# Patient Record
Sex: FEMALE | NOT HISPANIC OR LATINO | Employment: OTHER | URBAN - METROPOLITAN AREA
[De-identification: names, ages, dates, MRNs, and addresses within clinical notes are randomized per-mention and may not be internally consistent; named-entity substitution may affect disease eponyms.]

---

## 2019-08-08 ENCOUNTER — OFFICE VISIT (OUTPATIENT)
Dept: OPTOMETRY | Facility: CLINIC | Age: 64
End: 2019-08-08
Payer: COMMERCIAL

## 2019-08-08 DIAGNOSIS — H52.4 PRESBYOPIA: ICD-10-CM

## 2019-08-08 DIAGNOSIS — Z01.00 EXAMINATION OF EYES AND VISION: Primary | ICD-10-CM

## 2019-08-08 DIAGNOSIS — H52.223 REGULAR ASTIGMATISM OF BOTH EYES: ICD-10-CM

## 2019-08-08 DIAGNOSIS — E11.9 TYPE 2 DIABETES MELLITUS WITHOUT COMPLICATION, UNSPECIFIED WHETHER LONG TERM INSULIN USE (H): ICD-10-CM

## 2019-08-08 DIAGNOSIS — H25.13 AGE-RELATED NUCLEAR CATARACT OF BOTH EYES: ICD-10-CM

## 2019-08-08 DIAGNOSIS — H52.03 HYPEROPIA, BILATERAL: ICD-10-CM

## 2019-08-08 PROCEDURE — 92015 DETERMINE REFRACTIVE STATE: CPT | Performed by: OPTOMETRIST

## 2019-08-08 PROCEDURE — 92004 COMPRE OPH EXAM NEW PT 1/>: CPT | Performed by: OPTOMETRIST

## 2019-08-08 SDOH — HEALTH STABILITY: MENTAL HEALTH: HOW OFTEN DO YOU HAVE A DRINK CONTAINING ALCOHOL?: NEVER

## 2019-08-08 ASSESSMENT — VISUAL ACUITY
OD_CC: 20/40
OD_SC: 20/40
METHOD: SNELLEN - LINEAR
OD_CC: 20/30
OD_CC+: -1
OD_SC: 20/400
OS_SC: 20/400
CORRECTION_TYPE: GLASSES
OS_CC: 20/40
OS_SC: 20/40
OS_CC: 20/30

## 2019-08-08 ASSESSMENT — REFRACTION_WEARINGRX
OS_SPHERE: +2.75
OD_CYLINDER: +0.75
OS_VPRISM: .5
OD_SPHERE: +2.50
OD_HPRISM: .5
OD_VPRISM: .5
OS_AXIS: 002
OS_HPRISM: .5
OS_HBASE: IN
OD_AXIS: 166
OD_VBASE: DOWN
OS_CYLINDER: +0.50
OD_HBASE: OUT
OS_VBASE: DOWN

## 2019-08-08 ASSESSMENT — CUP TO DISC RATIO
OS_RATIO: 0.5
OD_RATIO: 0.5

## 2019-08-08 ASSESSMENT — CONF VISUAL FIELD
OS_NORMAL: 1
OD_NORMAL: 1

## 2019-08-08 ASSESSMENT — REFRACTION_MANIFEST
OD_CYLINDER: +1.00
OS_AXIS: 002
OS_CYLINDER: +0.50
OD_ADD: +2.50
OS_SPHERE: +1.25
OD_SPHERE: +1.25
OD_AXIS: 170
OS_ADD: +2.50

## 2019-08-08 ASSESSMENT — TONOMETRY
IOP_METHOD: TONOPEN
OS_IOP_MMHG: 18
OD_IOP_MMHG: 19

## 2019-08-08 ASSESSMENT — SLIT LAMP EXAM - LIDS
COMMENTS: NORMAL
COMMENTS: NORMAL

## 2019-08-08 ASSESSMENT — EXTERNAL EXAM - LEFT EYE: OS_EXAM: NORMAL

## 2019-08-08 ASSESSMENT — EXTERNAL EXAM - RIGHT EYE: OD_EXAM: NORMAL

## 2019-08-08 NOTE — PROGRESS NOTES
Chief Complaint   Patient presents with     Diabetic Eye Exam     Accompanied by   No results found for: A1C    Last Eye Exam: 2 years ago  Dilated Previously: Yes    What are you currently using to see?  glasses    Distance Vision Acuity: Satisfied with vision    Near Vision Acuity: Satisfied with vision while reading  with glasses    Eye Comfort: dry  Do you use eye drops? : Yes: OTC tears  Occupation or Hobbies: retired    Elinor Manzo  Vision Services Supervisor     Medical, surgical and family histories reviewed and updated 8/8/2019.       OBJECTIVE: See Ophthalmology exam    ASSESSMENT:    ICD-10-CM    1. Examination of eyes and vision Z01.00    2. Type 2 diabetes mellitus without complication, unspecified whether long term insulin use (H) E11.9    3. Age-related nuclear cataract of both eyes H25.13    4. Hyperopia, bilateral H52.03    5. Regular astigmatism of both eyes H52.223    6. Presbyopia H52.4       PLAN:    Belkis Baca aware  eye exam results will be sent to No Ref-Primary, Physician.  Patient Instructions   There are not any signs of the diabetes affecting the eyes today.  It is important that you get your eyes dilated once yearly and keep good control of your diabetes.    You have the start of mild cataracts.  You may notice some blurred vision or glare with night driving.  It is important that you wear good sunglasses to protect your eyes from the ultraviolet light from the sun.  Taking a supplement with at least 300 mg/day of vitamin C appears to help prevent cataract development.  I recommend that you return in 1 year for an eye exam unless there are any sudden changes in your vision.       Eyeglass prescription given.  Your options are a bifocal which would allow you to see distance and near vision or separate glasses for distance and reading.    Return in 1 year for a complete eye exam or sooner if needed.    Duncan Link, OD

## 2019-08-08 NOTE — PATIENT INSTRUCTIONS
There are not any signs of the diabetes affecting the eyes today.  It is important that you get your eyes dilated once yearly and keep good control of your diabetes.    You have the start of mild cataracts.  You may notice some blurred vision or glare with night driving.  It is important that you wear good sunglasses to protect your eyes from the ultraviolet light from the sun.  Taking a supplement with at least 300 mg/day of vitamin C appears to help prevent cataract development.  I recommend that you return in 1 year for an eye exam unless there are any sudden changes in your vision.       Eyeglass prescription given.  Your options are a bifocal which would allow you to see distance and near vision or separate glasses for distance and reading.    Return in 1 year for a complete eye exam or sooner if needed.    Duncan Link, NAIDA      The affects of the dilating drops last for 4- 6 hours.  You will be more sensitive to light and vision will be blurry up close.  Mydriatic sunglasses were given if needed.    Patient Education   Diabetes weakens the blood vessels all over the body, including the eyes. Damage to the blood vessels in the eyes can cause swelling or bleeding into part of the eye (called the retina). This is called diabetic retinopathy (JUVENTINO-tin-AH-puh-thee). If not treated, this disease can cause vision loss or blindness.   Symptoms may include blurred or distorted vision, but many people have no symptoms. It's important to see your eye doctor regularly to check for problems.   Early treatment and good control can help protect your vision. Here are the things you can do to help prevent vision loss:      1. Keep your blood sugar levels under tight control.      2. Bring high blood pressure under control.      3. No smoking.      4. Have yearly dilated eye exams.         Optometry Providers       Clinic Locations                                 Telephone Number   Dr. Kaye Song  [address] [facility]   [place]   [place] [phone]     [facility] Hours:                [facility] Hours:       [place] [facility] Hours:   [address]   [address]     [address]  [place] [address]   [place] [address]    [place] [address]  Phone: [phone]                    Phone: [phone]     Phone: [phone]                      Monday 8:00-7:00                          Monday 8:00-7:00                          Monday 8:00-7:00              Tuesday 8:00-6:00                          Tuesday 8:00-7:00                          Tuesday 8:00-7:00              Wednesday 8:00-6:00                  Wednesday 8:00-7:00                   Wednesday 8:00-7:00      Thursday 8:00-6:00                        Thursday 8:00-7:00                         Thursday 8:00-7:00            Friday 8:00-5:00                              Friday 8:00-5:00                              Friday 8:00-5:00    [facility] Hours:   [address] [place] [address]  [phone]    Monday 8:00-7:00  Tuesday 8:00-7:00  Wednesday 8:00-7:00  Thursday 8:00-7:00  Friday 8:00-5:00  Please log on to [email or web address] to order your contact lenses.  The link is found on the [facility] Care [facility] page.  As always, Thank you for trusting us with your health care needs!

## 2019-08-08 NOTE — LETTER
8/8/2019         RE: Belkis Baca  [address]  [facility] [address]        Dear Colleague,    Thank you for referring your patient, Belkis Baca, to the [facility]. Please see a copy of my visit note below.    Chief Complaint   Patient presents with     Diabetic Eye Exam     Accompanied by   No results found for: A1C    Last Eye Exam: 2 years ago  Dilated Previously: Yes    What are you currently using to see?  glasses    Distance Vision Acuity: Satisfied with vision    Near Vision Acuity: Satisfied with vision while reading  with glasses    Eye Comfort: dry  Do you use eye drops? : Yes: OTC tears  Occupation or Hobbies: retired    [employer]  Vision Services Supervisor     Medical, surgical and family histories reviewed and updated 8/8/2019.       OBJECTIVE: See Ophthalmology exam    ASSESSMENT:    ICD-10-CM    1. Examination of eyes and vision Z01.00    2. Type 2 diabetes mellitus without complication, unspecified whether long term insulin use (H) E11.9    3. Age-related nuclear cataract of both eyes H25.13    4. Hyperopia, bilateral H52.03    5. Regular astigmatism of both eyes H52.223    6. Presbyopia H52.4       PLAN:    Belkis Baca aware  eye exam results will be sent to No Ref-Primary, Physician.  Patient Instructions   There are not any signs of the diabetes affecting the eyes today.  It is important that you get your eyes dilated once yearly and keep good control of your diabetes.    You have the start of mild cataracts.  You may notice some blurred vision or glare with night driving.  It is important that you wear good sunglasses to protect your eyes from the ultraviolet light from the sun.  Taking a supplement with at least 300 mg/day of vitamin C appears to help prevent cataract development.  I recommend that you return in 1 year for an eye exam unless there are any sudden changes in your vision.       Eyeglass prescription given.  Your options are a bifocal which  would allow you to see distance and near vision or separate glasses for distance and reading.    Return in 1 year for a complete eye exam or sooner if needed.    Duncan Link, OD             Again, thank you for allowing me to participate in the care of your patient.        Sincerely,        Duncan Link, OD

## 2021-12-20 ENCOUNTER — OFFICE VISIT (OUTPATIENT)
Dept: OPTOMETRY | Facility: CLINIC | Age: 66
End: 2021-12-20
Payer: COMMERCIAL

## 2021-12-20 DIAGNOSIS — Z53.9 ERRONEOUS ENCOUNTER--DISREGARD: Primary | ICD-10-CM

## 2021-12-20 ASSESSMENT — REFRACTION_MANIFEST
OD_SPHERE: +1.25
OD_AXIS: 161
OD_CYLINDER: +2.25
OD_AXIS: 170
OS_CYLINDER: +0.50
OD_SPHERE: +0.25
OS_AXIS: 002
OD_CYLINDER: +1.00
METHOD_AUTOREFRACTION: 1
OS_SPHERE: +1.00
OS_AXIS: 003
OS_SPHERE: +1.25
OS_CYLINDER: +0.75

## 2021-12-20 ASSESSMENT — REFRACTION_WEARINGRX
OD_CYLINDER: +1.00
OD_AXIS: 170
OS_AXIS: 002
OS_AXIS: 004
OS_SPHERE: +1.25
OD_SPHERE: +2.50
OS_CYLINDER: +0.50
SPECS_TYPE: SVL
OD_SPHERE: +1.25
OS_ADD: +2.50
OS_SPHERE: +2.50
OD_AXIS: 170
OS_CYLINDER: +0.50
OD_CYLINDER: +0.50
OD_ADD: +2.50

## 2021-12-20 ASSESSMENT — VISUAL ACUITY
OD_SC: 20/200
OD_CC: 20/50
OD_PH_SC: 20/40
OS_SC: 20/200
OS_SC: 20/40
CORRECTION_TYPE: GLASSES
OS_PH_SC: 20/30
OS_CC: 20/50
METHOD: SNELLEN - LINEAR
OD_SC: 20/60

## 2021-12-20 ASSESSMENT — KERATOMETRY
OD_K1POWER_DIOPTERS: 44.25
OS_AXISANGLE2_DEGREES: 068
OS_AXISANGLE_DEGREES: 158
OD_AXISANGLE2_DEGREES: 095
OS_K2POWER_DIOPTERS: 45.25
OD_K2POWER_DIOPTERS: 44.75
OD_AXISANGLE_DEGREES: 005
OS_K1POWER_DIOPTERS: 44.25

## 2021-12-20 NOTE — PROGRESS NOTES
Chief Complaint   Patient presents with     Diabetic Eye Exam     Accompanied by  on ipad  Chief Complaint(s) and History of Present Illness(es)     Diabetic Eye Exam     Vision: is stable    Diabetes Type: Type 2 and taking oral medications    Duration: 20 years    Blood Sugars: is controlled               No results found for: A1C, HEMOGLOBINA1     patient unsure but thinks last a1c was 6 point something    Last Eye Exam: 8-8-2019  Dilated Previously: Yes    What are you currently using to see?  glasses    Distance Vision Acuity: Satisfied with vision    Near Vision Acuity: Satisfied with vision while reading  unaided    Eye Comfort: good  Do you use eye drops? : Yes: unknown otc drops  Occupation or Hobbies: none    Ekaterina Hernandez Optometric Assistant, A.B.O.C.     Medical, surgical and family histories reviewed and updated 12/20/2021.       OBJECTIVE: See Ophthalmology exam    ASSESSMENT:  No diagnosis found.   PLAN:    Belkis angela  eye exam results will be sent to No Ref-Primary, Physician.  There are no Patient Instructions on file for this visit.

## 2021-12-20 NOTE — LETTER
12/20/2021         RE: Belkis Baca  [address]  [facility] [address]        Dear Colleague,    Thank you for referring your patient, Belkis Baca, to the [facility]. Please see a copy of my visit note below.    Patient needed to leave for an appointment with her .  She rescheduled for tomorrow with Dr. Ashtyn Pearson.    Duncan Link OD        Again, thank you for allowing me to participate in the care of your patient.        Sincerely,        Duncan Link OD

## 2021-12-20 NOTE — PROGRESS NOTES
Patient needed to leave for an appointment with her .  She rescheduled for tomorrow with Dr. Ashtyn Pearson.    Duncan Link OD

## 2021-12-21 ENCOUNTER — APPOINTMENT (OUTPATIENT)
Dept: OPTOMETRY | Facility: CLINIC | Age: 66
End: 2021-12-21
Payer: COMMERCIAL

## 2021-12-21 ENCOUNTER — OFFICE VISIT (OUTPATIENT)
Dept: OPTOMETRY | Facility: CLINIC | Age: 66
End: 2021-12-21
Payer: COMMERCIAL

## 2021-12-21 DIAGNOSIS — H52.4 PRESBYOPIA: ICD-10-CM

## 2021-12-21 DIAGNOSIS — H25.13 AGE-RELATED NUCLEAR CATARACT OF BOTH EYES: ICD-10-CM

## 2021-12-21 DIAGNOSIS — E11.36 TYPE 2 DIABETES MELLITUS WITH DIABETIC CATARACT, UNSPECIFIED WHETHER LONG TERM INSULIN USE (H): Primary | ICD-10-CM

## 2021-12-21 DIAGNOSIS — H52.03 HYPEROPIA, BILATERAL: ICD-10-CM

## 2021-12-21 DIAGNOSIS — H52.223 REGULAR ASTIGMATISM OF BOTH EYES: ICD-10-CM

## 2021-12-21 PROCEDURE — V2020 VISION SVCS FRAMES PURCHASES: HCPCS | Performed by: OPTOMETRIST

## 2021-12-21 PROCEDURE — V2100 LENS SPHER SINGLE PLANO 4.00: HCPCS | Mod: RT | Performed by: OPTOMETRIST

## 2021-12-21 PROCEDURE — 92015 DETERMINE REFRACTIVE STATE: CPT | Performed by: OPTOMETRIST

## 2021-12-21 PROCEDURE — 92014 COMPRE OPH EXAM EST PT 1/>: CPT | Performed by: OPTOMETRIST

## 2021-12-21 RX ORDER — AMLODIPINE BESYLATE 10 MG/1
10 TABLET ORAL DAILY
COMMUNITY
Start: 2021-10-13

## 2021-12-21 RX ORDER — ATENOLOL 100 MG/1
1 TABLET ORAL DAILY
COMMUNITY
Start: 2021-10-27

## 2021-12-21 RX ORDER — ATORVASTATIN CALCIUM 40 MG/1
1 TABLET, FILM COATED ORAL DAILY
COMMUNITY
Start: 2021-10-11

## 2021-12-21 RX ORDER — GLIPIZIDE 5 MG/1
5 TABLET, FILM COATED, EXTENDED RELEASE ORAL DAILY
COMMUNITY
Start: 2021-11-03

## 2021-12-21 ASSESSMENT — REFRACTION_MANIFEST
OS_AXIS: 005
OD_ADD: +2.50
OS_CYLINDER: +0.75
OD_CYLINDER: +1.50
OS_CYLINDER: +0.75
OD_AXIS: 175
OD_AXIS: 174
METHOD_AUTOREFRACTION: 1
OS_AXIS: 005
OD_CYLINDER: +1.50
OS_SPHERE: +0.75
OD_SPHERE: PLANO
OS_SPHERE: +0.75
OD_SPHERE: 0.00
OS_ADD: +2.50

## 2021-12-21 ASSESSMENT — TONOMETRY
OD_IOP_MMHG: 17
OS_IOP_MMHG: 17
IOP_METHOD: APPLANATION

## 2021-12-21 ASSESSMENT — EXTERNAL EXAM - RIGHT EYE: OD_EXAM: NORMAL

## 2021-12-21 ASSESSMENT — KERATOMETRY
OS_AXISANGLE2_DEGREES: 67
OS_AXISANGLE_DEGREES: 157
OD_AXISANGLE_DEGREES: 10
OD_AXISANGLE2_DEGREES: 100
OD_K1POWER_DIOPTERS: 44.25
OS_K1POWER_DIOPTERS: 44.00
OD_K2POWER_DIOPTERS: 44.50
OS_K2POWER_DIOPTERS: 44.75

## 2021-12-21 ASSESSMENT — VISUAL ACUITY
OD_CC: 20/100
OD_SC: 20/70
METHOD: SNELLEN - LINEAR
OS_CC+: -1
OD_CC: 20/40
OD_SC+: -2
OS_CC: 20/40
OD_PH_SC: 20/40
OS_CC: 20/70
CORRECTION_TYPE: GLASSES
OS_SC: 20/50
OS_PH_SC: 20/40

## 2021-12-21 ASSESSMENT — SLIT LAMP EXAM - LIDS
COMMENTS: NORMAL
COMMENTS: NORMAL

## 2021-12-21 ASSESSMENT — CONF VISUAL FIELD
OS_NORMAL: 1
OD_NORMAL: 1

## 2021-12-21 ASSESSMENT — REFRACTION_WEARINGRX
OD_SPHERE: +2.50
OS_SPHERE: +2.50
OS_AXIS: 004
OD_AXIS: 170
SPECS_TYPE: SVL
OD_CYLINDER: +0.50
OS_CYLINDER: +0.50

## 2021-12-21 ASSESSMENT — CUP TO DISC RATIO
OS_RATIO: 0.5
OD_RATIO: 0.5

## 2021-12-21 ASSESSMENT — EXTERNAL EXAM - LEFT EYE: OS_EXAM: NORMAL

## 2021-12-21 NOTE — PROGRESS NOTES
Chief Complaint   Patient presents with     Diabetic Eye Exam     Accompanied by   Chief Complaint(s) and History of Present Illness(es)     Diabetic Eye Exam     Diabetes Type: Type 2 and taking oral medications    Duration: 20 years    Blood Sugars: is controlled               No results found for: A1C, HEMOGLOBINA1         Last Eye Exam: 08/08/2019  Dilated Previously: Yes    What are you currently using to see?  glasses    Distance Vision Acuity: Satisfied with vision    Near Vision Acuity: Satisfied with vision while reading and using computer with glasses    Eye Comfort: good  Do you use eye drops? : Yes: OTC- unsure of name   Occupation or Hobbies: Retired    Juandahiana Tomeka - Optometric Assistant     Medical, surgical and family histories reviewed and updated 12/21/2021.       OBJECTIVE: See Ophthalmology exam    ASSESSMENT:    ICD-10-CM    1. Type 2 diabetes mellitus with diabetic cataract, unspecified whether long term insulin use (H)  E11.36    2. Age-related nuclear cataract of both eyes  H25.13    3. Presbyopia  H52.4    4. Hyperopia, bilateral  H52.03    5. Regular astigmatism of both eyes  H52.223       PLAN:    Belkis Baca aware  eye exam results will be sent to No Ref-Primary, Physician.  Patient Instructions     Annual Eye examinations for Diabetes are recommended.  You have no evidence of diabetic retinopathy  You have the start of mild cataracts.  You may notice some blurred vision or glare with night driving.  It is important that you wear good sunglasses to protect your eyes from the ultraviolet light from the sun.  I recommend that you return in 1 year for an eye exam unless there are any sudden changes in your vision.       Eyeglass prescription given.    The affects of the dilating drops last for 4- 6 hours.  You will be more sensitive to light and vision will be blurry up close.  Do not drive if you do not feel comfortable.  Mydriatic sunglasses were given if needed.    RTC 1  sesar Pearson O.D.  [facility]   [address] [place] [address]    [phone]      Optometry Providers       Clinic Locations                                 Telephone Number   Dr. Kaye Mccain    [place]   [facility]  [place] [phone]     [place] [facility] Hours:                [place] Optical Hours:       [place] [facility] Hours:   [address]   [address] [facility] [address]     [address] [place] [address]   [place] [address]    [place] [address]  Phone: [phone]                    Phone: [phone]     Phone: [phone]                      Monday 8:00-6:00                          Monday 8:00-6:00                          Monday 8:00-6:00              Tuesday 8:00-6:00                          Tuesday 8:00-6:00                          Tuesday 8:00-6:00              Wednesday 8:00-6:00                  Wednesday 8:00-6:00                   Wednesday 8:00-6:00      Thursday 8:00-6:00                        Thursday 8:00-6:00                         Thursday 8:00-6:00            Friday 8:00-5:00                              Friday 8:00-5:00                              Friday 8:00-5:00    [facility] Hours:   [address] [place] [address]  [phone]    Monday 9:00-6:00  Tuesday 9:00-6:00  Wednesday 9:00-6:00  Thursday 9:00-6:00  Friday 9:00-5:00  Please log on to [email or web address] to order your contact lenses.  The link is found on the Eye Care and Vision Services page.  As always, Thank you for trusting us with your health care needs!

## 2021-12-21 NOTE — LETTER
12/21/2021         RE: Belkis Baca  [address]  [facility] [address]        Dear Colleague,    Thank you for referring your patient, Belkis Baca, to the [facility]. Please see a copy of my visit note below.    Chief Complaint   Patient presents with     Diabetic Eye Exam     Accompanied by   Chief Complaint(s) and History of Present Illness(es)     Diabetic Eye Exam     Diabetes Type: Type 2 and taking oral medications    Duration: 20 years    Blood Sugars: is controlled               No results found for: A1C, HEMOGLOBINA1         Last Eye Exam: 08/08/2019  Dilated Previously: Yes    What are you currently using to see?  glasses    Distance Vision Acuity: Satisfied with vision    Near Vision Acuity: Satisfied with vision while reading and using computer with glasses    Eye Comfort: good  Do you use eye drops? : Yes: OTC- unsure of name   Occupation or Hobbies: Retired    Audrey Tomeka - Optometric Assistant     Medical, surgical and family histories reviewed and updated 12/21/2021.       OBJECTIVE: See Ophthalmology exam    ASSESSMENT:    ICD-10-CM    1. Type 2 diabetes mellitus with diabetic cataract, unspecified whether long term insulin use (H)  E11.36    2. Age-related nuclear cataract of both eyes  H25.13    3. Presbyopia  H52.4    4. Hyperopia, bilateral  H52.03    5. Regular astigmatism of both eyes  H52.223       PLAN:    Belkis Baca aware  eye exam results will be sent to No Ref-Primary, Physician.  Patient Instructions     Annual Eye examinations for Diabetes are recommended.  You have no evidence of diabetic retinopathy  You have the start of mild cataracts.  You may notice some blurred vision or glare with night driving.  It is important that you wear good sunglasses to protect your eyes from the ultraviolet light from the sun.  I recommend that you return in 1 year for an eye exam unless there are any sudden changes in your vision.       Eyeglass  prescription given.    The affects of the dilating drops last for 4- 6 hours.  You will be more sensitive to light and vision will be blurry up close.  Do not drive if you do not feel comfortable.  Mydriatic sunglasses were given if needed.    RTC 1 year    Ashtyn Pearson O.D.  [facility]   [address] [place] [address]    [phone]      Optometry Providers       Clinic Locations                                 Telephone Number   Dr. Kaye Mccain    [place]   [facility]  [place] [phone]     [place] Optical Hours:                [facility] [facility] [employer] [facility] Optical Hours:       [place] Optical Hours:   [address] [facility]   [address] [facility] [address]     [address] [place] [address]   [place] [address]    [place] [address]  Phone: [phone]                    Phone: [phone]     Phone: [phone]                      Monday 8:00-6:00                          Monday 8:00-6:00                          Monday 8:00-6:00              Tuesday 8:00-6:00                          Tuesday 8:00-6:00                          Tuesday 8:00-6:00              Wednesday 8:00-6:00                  Wednesday 8:00-6:00                   Wednesday 8:00-6:00      Thursday 8:00-6:00                        Thursday 8:00-6:00                         Thursday 8:00-6:00            Friday 8:00-5:00                              Friday 8:00-5:00                              Friday 8:00-5:00    [facility] Hours:   [address] [place] [address]  [phone]    Monday 9:00-6:00  Tuesday 9:00-6:00  Wednesday 9:00-6:00  Thursday 9:00-6:00  Friday 9:00-5:00  Please log on to [email or web address] to order your contact lenses.  The link is found on the Eye Care and Vision Services page.  As always, Thank you for trusting us with your health care needs!                     Again, thank you for allowing  me to participate in the care of your patient.        Sincerely,        Ashtyn Pearson OD

## 2021-12-21 NOTE — PATIENT INSTRUCTIONS
Annual Eye examinations for Diabetes are recommended.  You have no evidence of diabetic retinopathy  You have the start of mild cataracts.  You may notice some blurred vision or glare with night driving.  It is important that you wear good sunglasses to protect your eyes from the ultraviolet light from the sun.  I recommend that you return in 1 year for an eye exam unless there are any sudden changes in your vision.       Eyeglass prescription given.    The affects of the dilating drops last for 4- 6 hours.  You will be more sensitive to light and vision will be blurry up close.  Do not drive if you do not feel comfortable.  Mydriatic sunglasses were given if needed.    RTC 1 year    Ashtyn Pearson O.D.  [facility]   [address] [place], [place] [address] [phone]      Optometry Providers       Clinic Locations                                 Telephone Number   Dr. Kaye Pearson [place]    [facility] [phone]     [place] Optical Hours:                [place] Optical Hours:       [place] [facility] Hours:   [address]   [address] [facility] [address]     [address] [place] [address]   [place] [address]    [place] [address]  Phone: [phone]                    Phone: [phone]     Phone: [phone]                      Monday 8:00-6:00                          Monday 8:00-6:00                          Monday 8:00-6:00              Tuesday 8:00-6:00                          Tuesday 8:00-6:00                          Tuesday 8:00-6:00              Wednesday 8:00-6:00                  Wednesday 8:00-6:00                   Wednesday 8:00-6:00      Thursday 8:00-6:00                        Thursday 8:00-6:00                         Thursday 8:00-6:00            Friday 8:00-5:00                              Friday 8:00-5:00                              Friday  8:00-5:00    [facility] Hours:   [address] [place] [address]  [phone]    Monday 9:00-6:00  Tuesday 9:00-6:00  Wednesday 9:00-6:00  Thursday 9:00-6:00  Friday 9:00-5:00  Please log on to [email or web address] to order your contact lenses.  The link is found on the Eye Care and Vision Services page.  As always, Thank you for trusting us with your health care needs!

## 2022-01-10 ENCOUNTER — APPOINTMENT (OUTPATIENT)
Dept: OPTOMETRY | Facility: CLINIC | Age: 67
End: 2022-01-10
Payer: COMMERCIAL

## 2022-01-10 PROCEDURE — 92340 FIT SPECTACLES MONOFOCAL: CPT | Performed by: OPTOMETRIST

## 2023-06-08 ENCOUNTER — OFFICE VISIT (OUTPATIENT)
Dept: OPTOMETRY | Facility: CLINIC | Age: 68
End: 2023-06-08
Payer: COMMERCIAL

## 2023-06-08 DIAGNOSIS — E11.36 TYPE 2 DIABETES MELLITUS WITH DIABETIC CATARACT, UNSPECIFIED WHETHER LONG TERM INSULIN USE (H): Primary | ICD-10-CM

## 2023-06-08 DIAGNOSIS — H52.03 HYPEROPIA, BILATERAL: ICD-10-CM

## 2023-06-08 DIAGNOSIS — H25.13 AGE-RELATED NUCLEAR CATARACT OF BOTH EYES: ICD-10-CM

## 2023-06-08 DIAGNOSIS — H52.223 REGULAR ASTIGMATISM OF BOTH EYES: ICD-10-CM

## 2023-06-08 DIAGNOSIS — H52.4 PRESBYOPIA: ICD-10-CM

## 2023-06-08 PROCEDURE — 99214 OFFICE O/P EST MOD 30 MIN: CPT | Performed by: OPTOMETRIST

## 2023-06-08 PROCEDURE — 92015 DETERMINE REFRACTIVE STATE: CPT | Performed by: OPTOMETRIST

## 2023-06-08 ASSESSMENT — CONF VISUAL FIELD
OD_INFERIOR_TEMPORAL_RESTRICTION: 0
OS_SUPERIOR_TEMPORAL_RESTRICTION: 0
OS_INFERIOR_NASAL_RESTRICTION: 0
OD_NORMAL: 1
OS_INFERIOR_TEMPORAL_RESTRICTION: 0
OD_SUPERIOR_NASAL_RESTRICTION: 0
OS_SUPERIOR_NASAL_RESTRICTION: 0
OS_NORMAL: 1
OD_SUPERIOR_TEMPORAL_RESTRICTION: 0
OD_INFERIOR_NASAL_RESTRICTION: 0

## 2023-06-08 ASSESSMENT — REFRACTION_MANIFEST
OD_AXIS: 175
OS_CYLINDER: +0.75
OS_AXIS: 180
OD_SPHERE: PLANO
OD_CYLINDER: +1.75
OS_SPHERE: +0.50
OS_ADD: +2.50
OD_ADD: +2.50

## 2023-06-08 ASSESSMENT — REFRACTION_WEARINGRX
OS_AXIS: 005
OD_ADD: +2.50
OS_ADD: +2.50
OD_AXIS: 175
OS_CYLINDER: +0.75
OD_SPHERE: PLANO
OS_SPHERE: +0.75
OD_CYLINDER: +1.50
SPECS_TYPE: BIFOCAL

## 2023-06-08 ASSESSMENT — VISUAL ACUITY
OD_CC: 20/200
METHOD: SNELLEN - LINEAR
CORRECTION_TYPE: GLASSES
OS_CC: 20/40
OS_CC: 20/200
OD_CC: 20/40

## 2023-06-08 ASSESSMENT — CUP TO DISC RATIO
OS_RATIO: 0.5
OD_RATIO: 0.5

## 2023-06-08 ASSESSMENT — EXTERNAL EXAM - LEFT EYE: OS_EXAM: NORMAL

## 2023-06-08 ASSESSMENT — SLIT LAMP EXAM - LIDS
COMMENTS: NORMAL
COMMENTS: NORMAL

## 2023-06-08 ASSESSMENT — TONOMETRY
OS_IOP_MMHG: 14
OD_IOP_MMHG: 14
IOP_METHOD: APPLANATION

## 2023-06-08 ASSESSMENT — EXTERNAL EXAM - RIGHT EYE: OD_EXAM: NORMAL

## 2023-06-08 NOTE — PATIENT INSTRUCTIONS
Patient Education   Diabetes weakens the blood vessels all over the body, including the eyes. Damage to the blood vessels in the eyes can cause swelling or bleeding into part of the eye (called the retina). This is called diabetic retinopathy (JUVENTINO-tin-AH-puh-thee). If not treated, this disease can cause vision loss or blindness.   Symptoms may include blurred or distorted vision, but many people have no symptoms. It's important to see your eye doctor regularly to check for problems.   Early treatment and good control can help protect your vision. Here are the things you can do to help prevent vision loss:      1. Keep your blood sugar levels under tight control.      2. Bring high blood pressure under control.      3. No smoking.      4. Have yearly dilated eye exams.     You have cataracts.  You may notice some blurred vision or glare with night driving.  It is important that you wear good sunglasses to protect your eyes from the ultraviolet light from the sun.  I recommend that you return in 1 year for an eye exam unless there are any sudden changes in your vision.       Eyeglass prescription given.  Hold FOR NOW UNTIL AFTER CATARACT SURGERY CONSULT    The affects of the dilating drops last for 4- 6 hours.  You will be more sensitive to light and vision will be blurry up close.  Do not drive if you do not feel comfortable.  Mydriatic sunglasses were given if needed.    Recommend annual eye exams. See OPHTHALMOLOGY ONLY FOR GLAUCOMA SUSPECT    Ashtyn Pearson O.D.  [facility]   [address] [place] [address]    [phone]

## 2023-06-08 NOTE — PROGRESS NOTES
Chief Complaint   Patient presents with     Diabetic Eye Exam     Accompanied by   Chief Complaint(s) and History of Present Illness(es)     Diabetic Eye Exam                No results found for: A1C         Last Eye Exam: 2021  Dilated Previously: Yes    What are you currently using to see?  glasses    Distance Vision Acuity: Satisfied with vision    Near Vision Acuity: Satisfied with vision while reading and using computer with glasses    Eye Comfort: good  Do you use eye drops? : Yes: OTC B&L Advance Eye Relief Dry Eye      Denelle Tomeka - Optometric Assistant     Medical, surgical and family histories reviewed and updated 6/8/2023.       OBJECTIVE: See Ophthalmology exam    ASSESSMENT:  No diagnosis found.    PLAN:    Belkis Baca aware  eye exam results will be sent to No Ref-Primary, Physician.  There are no Patient Instructions on file for this visit.

## 2023-06-08 NOTE — LETTER
6/8/2023         RE: Belkis Baca  [address]  [facility] [address]        Dear Colleague,    Thank you for referring your patient, Belkis Baca, to the [facility]. Please see a copy of my visit note below.    Chief Complaint   Patient presents with     Diabetic Eye Exam     Accompanied by   Chief Complaint(s) and History of Present Illness(es)     Diabetic Eye Exam                No results found for: A1C         Last Eye Exam: 2021  Dilated Previously: Yes    What are you currently using to see?  glasses    Distance Vision Acuity: Satisfied with vision    Near Vision Acuity: Satisfied with vision while reading and using computer with glasses    Eye Comfort: good  Do you use eye drops? : Yes: OTC B&L Advance Eye Relief Dry Eye      Dendahiana Goodmanpps - Optometric Assistant     Medical, surgical and family histories reviewed and updated 6/8/2023.       OBJECTIVE: See Ophthalmology exam    ASSESSMENT:  No diagnosis found.    PLAN:    Belkis Baca aware  eye exam results will be sent to No Ref-Primary, Physician.  There are no Patient Instructions on file for this visit.           Again, thank you for allowing me to participate in the care of your patient.        Sincerely,        Ashtyn Pearson OD

## 2023-08-07 ENCOUNTER — OFFICE VISIT (OUTPATIENT)
Dept: OPHTHALMOLOGY | Facility: CLINIC | Age: 68
End: 2023-08-07
Attending: OPTOMETRIST
Payer: COMMERCIAL

## 2023-08-07 DIAGNOSIS — H25.13 AGE-RELATED NUCLEAR CATARACT OF BOTH EYES: Primary | ICD-10-CM

## 2023-08-07 DIAGNOSIS — E11.9 TYPE 2 DIABETES MELLITUS WITHOUT RETINOPATHY (H): ICD-10-CM

## 2023-08-07 DIAGNOSIS — H52.03 HYPEROPIA OF BOTH EYES WITH ASTIGMATISM AND PRESBYOPIA: ICD-10-CM

## 2023-08-07 DIAGNOSIS — H52.203 HYPEROPIA OF BOTH EYES WITH ASTIGMATISM AND PRESBYOPIA: ICD-10-CM

## 2023-08-07 DIAGNOSIS — H52.4 HYPEROPIA OF BOTH EYES WITH ASTIGMATISM AND PRESBYOPIA: ICD-10-CM

## 2023-08-07 DIAGNOSIS — E11.36 TYPE 2 DIABETES MELLITUS WITH DIABETIC CATARACT, UNSPECIFIED WHETHER LONG TERM INSULIN USE (H): ICD-10-CM

## 2023-08-07 DIAGNOSIS — H40.003 GLAUCOMA SUSPECT OF BOTH EYES: ICD-10-CM

## 2023-08-07 PROCEDURE — 92004 COMPRE OPH EXAM NEW PT 1/>: CPT | Performed by: OPHTHALMOLOGY

## 2023-08-07 PROCEDURE — 92133 CPTRZD OPH DX IMG PST SGM ON: CPT | Performed by: OPHTHALMOLOGY

## 2023-08-07 RX ORDER — HYDROCHLOROTHIAZIDE 25 MG/1
1 TABLET ORAL DAILY
COMMUNITY
Start: 2023-07-31 | End: 2024-07-30

## 2023-08-07 RX ORDER — EMPAGLIFLOZIN 10 MG/1
TABLET, FILM COATED ORAL
COMMUNITY

## 2023-08-07 ASSESSMENT — REFRACTION_MANIFEST
OS_CYLINDER: +0.75
OS_AXIS: 180
OD_AXIS: 175
OD_SPHERE: PLANO
OD_CYLINDER: +1.75
OS_SPHERE: +0.50

## 2023-08-07 ASSESSMENT — PACHYMETRY
OD_CT(UM): 500
OS_CT(UM): 508

## 2023-08-07 ASSESSMENT — CUP TO DISC RATIO
OD_RATIO: 0.5
OS_RATIO: 0.5

## 2023-08-07 ASSESSMENT — CONF VISUAL FIELD
OD_NORMAL: 1
METHOD: COUNTING FINGERS
OS_INFERIOR_TEMPORAL_RESTRICTION: 0
OD_SUPERIOR_NASAL_RESTRICTION: 0
OD_SUPERIOR_TEMPORAL_RESTRICTION: 0
OD_INFERIOR_NASAL_RESTRICTION: 0
OD_INFERIOR_TEMPORAL_RESTRICTION: 0
OS_SUPERIOR_TEMPORAL_RESTRICTION: 0
OS_SUPERIOR_NASAL_RESTRICTION: 0
OS_NORMAL: 1
OS_INFERIOR_NASAL_RESTRICTION: 0

## 2023-08-07 ASSESSMENT — SLIT LAMP EXAM - LIDS
COMMENTS: NORMAL
COMMENTS: NORMAL

## 2023-08-07 ASSESSMENT — TONOMETRY
IOP_METHOD: TONOPEN
OS_IOP_MMHG: 16
OD_IOP_MMHG: 15

## 2023-08-07 ASSESSMENT — VISUAL ACUITY
METHOD: SNELLEN - LINEAR
CORRECTION_TYPE: GLASSES
OD_CC: 20/40
OS_CC: 20/40

## 2023-08-07 ASSESSMENT — EXTERNAL EXAM - RIGHT EYE: OD_EXAM: NORMAL

## 2023-08-07 ASSESSMENT — EXTERNAL EXAM - LEFT EYE: OS_EXAM: NORMAL

## 2023-08-07 NOTE — PROGRESS NOTES
HPI       Glaucoma    In both eyes.             Cataract Evaluation    In both eyes.             Comments    Patient referred by Dr Landers for glaucoma evaluation and cataract evaluation.   Patient currently wears glasses.   Patient denies difficulty with her vision, states she see's clearly with her glasses.  No drops used.     DM2   A1C  7.7 according to patient.           Last edited by Renan Ivory on 8/7/2023  9:08 AM.         Review of systems for the eyes was negative other than the pertinent positives/negatives listed in the HPI.      Assessment & Plan    HPI:  Belkis Baca is a 68 year old female with history of T2DM, HTN, HLD, hyperopia with astigmatism and presbyopia presents from Dr. Cipriano Pearson for cataract evaluation. She denies difficulty with her vision. Only issue on V8-FR questionnaire is with fine close work.    POHx: hyperopia with astigmatism and presbyopia  PMHx: T2DM, HTN, HLD  Current Medications: amLODIPine (NORVASC) 10 MG tablet, Take 10 mg by mouth daily  aspirin (ASA) 81 MG EC tablet, TAKE 1 TABLET BY MOUTH MONDAY, WEDNESDAY, AND FRIDAY AS DIRECTED  atenolol (TENORMIN) 100 MG tablet, Take 1 tablet by mouth daily  atorvastatin (LIPITOR) 40 MG tablet, Take 1 tablet by mouth daily  glipiZIDE (GLUCOTROL XL) 5 MG 24 hr tablet, Take 5 mg by mouth daily  hydrochlorothiazide (HYDRODIURIL) 25 MG tablet, Take 1 tablet by mouth daily  JARDIANCE 10 MG TABS tablet, Take 1 Tablet (10 mg) by mouth daily. Indications: Type 2 Diabetes, take with glass of water*  metFORMIN (GLUCOPHAGE) 1000 MG tablet, Take 1,000 mg by mouth 2 times daily (with meals)    No current facility-administered medications on file prior to visit.    FHx: denies family history of ocular conditions   PSHx: denies history of ocular surgeries       Current Eye Medications:      Assessment & Plan:  (H25.13) Age-related nuclear cataract of both eyes - Both Eyes  (primary encounter diagnosis)  Mild cataracts are present and  may account for some of the patient's visual complaints. Patient does not complain of difficulty with vision and only meets one requirement on V8-FR questionnaire. Patient legal to drive with current glasses.   No treatment currently recommended. The patient will monitor for vision changes and contact us with any decrease in vision. Recheck in six months    (H40.003) Glaucoma suspect of both eyes  Based on c/disc ratio  IOP within normal limits, oct retinal nerve fiber layer within normal limits  Recommend annual oct rnfl    (E11.36) Type 2 diabetes mellitus with diabetic cataract, unspecified whether long term insulin use (H) - Both Eyes  (E11.9) Type 2 diabetes mellitus without retinopathy (H)  Most recent HgBA1c 7.7 on 7/28/23  No background diabetic retinopathy or neovascularization noted on today's exam.  Discussed ocular and systemic benefits of blood pressure and blood sugar control.  Return in 1 year for full exam with dilation or sooner if changes to vision.        (H52.03,  H52.203,  H52.4) Hyperopia of both eyes with astigmatism and presbyopia  Doing well in current Mrx      Return in about 6 months (around 2/7/2024), or if symptoms worsen or fail to improve, for Annual Visit-v/t/d/MRx. possible cataract calcs.        Doug Chan MD     Attending Physician Attestation:  Complete documentation of historical and exam elements from today's encounter can be found in the full encounter summary report (not reduplicated in this progress note).  I personally obtained the chief complaint(s) and history of present illness.  I confirmed and edited as necessary the review of systems, past medical/surgical history, family history, social history, and examination findings as documented by others; and I examined the patient myself.  I personally reviewed the relevant tests, images, and reports as documented above.  I formulated and edited as necessary the assessment and plan and discussed the findings and  management plan with the patient and family. - Doug Chan MD

## 2023-08-07 NOTE — LETTER
8/7/2023         RE: Belkis Baca  [address]  [facility] [address]        Dear Dr. Cipriano Pearson,    Thank you for referring your patient, Belkis Baca, to the [facility]. She is not interested in cataract surgery at this time despite her moderate cataracts. I will observe and have her return in 6 months to consider surgery again.     Please see a copy of my visit note below.    HPI       Glaucoma    In both eyes.             Cataract Evaluation    In both eyes.             Comments    Patient referred by Dr Landers for glaucoma evaluation and cataract evaluation.   Patient currently wears glasses.   Patient denies difficulty with her vision, states she see's clearly with her glasses.  No drops used.     DM2   A1C  7.7 according to patient.           Last edited by Renan Ivory on 8/7/2023  9:08 AM.         Review of systems for the eyes was negative other than the pertinent positives/negatives listed in the HPI.      Assessment & Plan    HPI:  Belkis Baca is a 68 year old female with history of T2DM, HTN, HLD, hyperopia with astigmatism and presbyopia presents from Dr. Cipriano Pearson for cataract evaluation. She denies difficulty with her vision. Only issue on V8-FR questionnaire is with fine close work.    POHx: hyperopia with astigmatism and presbyopia  PMHx: T2DM, HTN, HLD  Current Medications: amLODIPine (NORVASC) 10 MG tablet, Take 10 mg by mouth daily  aspirin (ASA) 81 MG EC tablet, TAKE 1 TABLET BY MOUTH MONDAY, WEDNESDAY, AND FRIDAY AS DIRECTED  atenolol (TENORMIN) 100 MG tablet, Take 1 tablet by mouth daily  atorvastatin (LIPITOR) 40 MG tablet, Take 1 tablet by mouth daily  glipiZIDE (GLUCOTROL XL) 5 MG 24 hr tablet, Take 5 mg by mouth daily  hydrochlorothiazide (HYDRODIURIL) 25 MG tablet, Take 1 tablet by mouth daily  JARDIANCE 10 MG TABS tablet, Take 1 Tablet (10 mg) by mouth daily. Indications: Type 2 Diabetes, take with glass of water*  metFORMIN  (GLUCOPHAGE) 1000 MG tablet, Take 1,000 mg by mouth 2 times daily (with meals)    No current facility-administered medications on file prior to visit.    FHx: denies family history of ocular conditions   PSHx: denies history of ocular surgeries       Current Eye Medications:      Assessment & Plan:  (H25.13) Age-related nuclear cataract of both eyes - Both Eyes  (primary encounter diagnosis)  Mild cataracts are present and may account for some of the patient's visual complaints. Patient does not complain of difficulty with vision and only meets one requirement on V8-FR questionnaire. Patient legal to drive with current glasses.   No treatment currently recommended. The patient will monitor for vision changes and contact us with any decrease in vision. Recheck in six months    (H40.003) Glaucoma suspect of both eyes  Based on c/disc ratio  IOP within normal limits, oct retinal nerve fiber layer within normal limits  Recommend annual oct rnfl    (E11.36) Type 2 diabetes mellitus with diabetic cataract, unspecified whether long term insulin use (H) - Both Eyes  (E11.9) Type 2 diabetes mellitus without retinopathy (H)  Most recent HgBA1c 7.7 on 7/28/23  No background diabetic retinopathy or neovascularization noted on today's exam.  Discussed ocular and systemic benefits of blood pressure and blood sugar control.  Return in 1 year for full exam with dilation or sooner if changes to vision.        (H52.03,  H52.203,  H52.4) Hyperopia of both eyes with astigmatism and presbyopia  Doing well in current Mrx      Return in about 6 months (around 2/7/2024), or if symptoms worsen or fail to improve, for Annual Visit-v/t/d/MRx. possible cataract calcs.        Doug Chan MD     Attending Physician Attestation:  Complete documentation of historical and exam elements from today's encounter can be found in the full encounter summary report (not reduplicated in this progress note).  I personally obtained the chief  complaint(s) and history of present illness.  I confirmed and edited as necessary the review of systems, past medical/surgical history, family history, social history, and examination findings as documented by others; and I examined the patient myself.  I personally reviewed the relevant tests, images, and reports as documented above.  I formulated and edited as necessary the assessment and plan and discussed the findings and management plan with the patient and family. - Doug Chan MD           Again, thank you for allowing me to participate in the care of your patient.        Sincerely,        Doug Chan MD

## 2023-08-07 NOTE — NURSING NOTE
Chief Complaints and History of Present Illnesses   Patient presents with    Glaucoma    Cataract Evaluation       Chief Complaint(s) and History of Present Illness(es)       Glaucoma              Laterality: both eyes              Cataract Evaluation              Laterality: both eyes              Comments    Patient referred by Dr Landers for glaucoma evaluation and cataract evaluation.   Patient currently wears glasses.   Patient denies difficulty with her vision, states she see's clearly with her glasses.  No drops used.     DM2   A1C  7.7 according to patient.                    Renan Ivory, Ophthalmic Assistant

## 2024-11-04 ENCOUNTER — OFFICE VISIT (OUTPATIENT)
Dept: OPHTHALMOLOGY | Facility: CLINIC | Age: 69
End: 2024-11-04
Payer: COMMERCIAL

## 2024-11-04 DIAGNOSIS — H25.813 COMBINED FORM OF AGE-RELATED CATARACT, BOTH EYES: Primary | ICD-10-CM

## 2024-11-04 DIAGNOSIS — H40.003 GLAUCOMA SUSPECT OF BOTH EYES: ICD-10-CM

## 2024-11-04 PROCEDURE — 76519 ECHO EXAM OF EYE: CPT | Mod: 50 | Performed by: OPHTHALMOLOGY

## 2024-11-04 PROCEDURE — 92133 CPTRZD OPH DX IMG PST SGM ON: CPT | Performed by: OPHTHALMOLOGY

## 2024-11-04 PROCEDURE — 99214 OFFICE O/P EST MOD 30 MIN: CPT | Performed by: OPHTHALMOLOGY

## 2024-11-04 ASSESSMENT — REFRACTION_MANIFEST
OS_CYLINDER: +0.75
OD_CYLINDER: +1.00
OD_SPHERE: -0.25
OD_ADD: +2.50
OS_ADD: +2.50
OD_AXIS: 173
OS_AXIS: 013
OS_SPHERE: PLANO

## 2024-11-04 ASSESSMENT — CUP TO DISC RATIO
OD_RATIO: 0.5
OS_RATIO: 0.5

## 2024-11-04 ASSESSMENT — CONF VISUAL FIELD
OD_INFERIOR_NASAL_RESTRICTION: 0
OD_INFERIOR_TEMPORAL_RESTRICTION: 0
OS_SUPERIOR_NASAL_RESTRICTION: 0
OD_NORMAL: 1
METHOD: COUNTING FINGERS
OD_SUPERIOR_NASAL_RESTRICTION: 0
OD_SUPERIOR_TEMPORAL_RESTRICTION: 0
OS_NORMAL: 1
OS_SUPERIOR_TEMPORAL_RESTRICTION: 0
OS_INFERIOR_NASAL_RESTRICTION: 0
OS_INFERIOR_TEMPORAL_RESTRICTION: 0

## 2024-11-04 ASSESSMENT — EXTERNAL EXAM - LEFT EYE: OS_EXAM: NORMAL

## 2024-11-04 ASSESSMENT — REFRACTION_WEARINGRX
SPECS_TYPE: SVL
OD_CYLINDER: +1.50
OD_SPHERE: PLANO
OS_SPHERE: +0.75
OS_AXIS: 011
OS_CYLINDER: +0.75
OD_AXIS: 174

## 2024-11-04 ASSESSMENT — VISUAL ACUITY
OS_CC: 20/50
METHOD: SNELLEN - LINEAR
OD_PH_CC: 20/40
CORRECTION_TYPE: GLASSES
OS_PH_CC+: -1
METHOD_MR: DIAGNOSTIC MR
OD_CC: 20/70
OS_PH_CC: 20/40

## 2024-11-04 ASSESSMENT — SLIT LAMP EXAM - LIDS
COMMENTS: NORMAL
COMMENTS: NORMAL

## 2024-11-04 ASSESSMENT — TONOMETRY
OS_IOP_MMHG: 14
OD_IOP_MMHG: 15
IOP_METHOD: APPLANATION

## 2024-11-04 ASSESSMENT — EXTERNAL EXAM - RIGHT EYE: OD_EXAM: NORMAL

## 2024-11-04 NOTE — NURSING NOTE
"Chief Complaints and History of Present Illnesses   Patient presents with    Diabetic Eye Exam     Chief Complaint(s) and History of Present Illness(es)       Diabetic Eye Exam               Comments    Pt returns for a diabetic eye exam and as in follow up of cataracts and glaucoma suspect both eyes. She feels her vision is stable since her last eye exam, but would like to know if her cataracts have progressed. If they have changed, she is ready to pursue cataract surgery now. No eye pain or new flashes/floaters each eye.     No results found for: \"A1C\", but pt reports it was 7.9.                     "

## 2024-11-05 NOTE — PROGRESS NOTES
"HPI    Pt returns for a diabetic eye exam and as in follow up of cataracts and glaucoma suspect both eyes. She feels her vision is stable since her last eye exam, but would like to know if her cataracts have progressed. If they have changed, she is ready to pursue cataract surgery now. No eye pain or new flashes/floaters each eye.     No results found for: \"A1C\", but pt reports it was 7.9.     History and physical examination completed with assistance of iPad      Last edited by Doug Chan MD on 11/4/2024  3:51 PM.         Review of systems for the eyes was negative other than the pertinent positives/negatives listed in the HPI.      Assessment & Plan    HPI:  Belkis Baca is a 69 year old female with history of T2DM, HTN, HLD, hyperopia with astigmatism and presbyopia presents from Dr. Cipriano Pearson for followup glaucoma and cataract evaluation. She is ready to pursue cataract surgery given her decreased vision.    POHx: hyperopia with astigmatism and presbyopia  PMHx: T2DM, HTN, HLD  Current Medications:   Current Outpatient Medications   Medication Sig Dispense Refill    amLODIPine (NORVASC) 10 MG tablet Take 10 mg by mouth daily      aspirin (ASA) 81 MG EC tablet TAKE 1 TABLET BY MOUTH MONDAY, WEDNESDAY, AND FRIDAY AS DIRECTED      atenolol (TENORMIN) 100 MG tablet Take 1 tablet by mouth daily      atorvastatin (LIPITOR) 40 MG tablet Take 1 tablet by mouth daily      JARDIANCE 10 MG TABS tablet Take 1 Tablet (10 mg) by mouth daily. Indications: Type 2 Diabetes, take with glass of water*      metFORMIN (GLUCOPHAGE) 1000 MG tablet Take 1,000 mg by mouth 2 times daily (with meals)      glipiZIDE (GLUCOTROL XL) 5 MG 24 hr tablet Take 5 mg by mouth daily      hydrochlorothiazide (HYDRODIURIL) 25 MG tablet Take 1 tablet by mouth daily       No current facility-administered medications for this visit.     FHx: denies family history of ocular conditions   PSHx: denies history of ocular " surgeries       Current Eye Medications:      Assessment & Plan:  (H25.13) Age-related nuclear cataract of both eyes - Both Eyes  (primary encounter diagnosis)  Special equipment/needs:  Eye: right  Anesthesia:topical  Dilates to: 7  Iris expansion:  Unlikely  Pseudoexfoliation: No  Trypan Blue: No  Trauma: No    Able lay to flat: Yes  Blood Thinner: Yes ASA 81  Tamsulosin: No  DM: Yes  Guttae: No    Dominant Eye:     Plan: [place] [employer] both eyes  Cataract is believed to be significantly contributing to patient's visual impairment. Cataract surgery recommended and expected to improve vision. Vision is not correctable by glasses or other nonsurgical measures. R/B/A were discussed with the patient. These included, but are not limited to: bleeding, infection, loss of vision, loss of the eye, need for more surgery, glaucoma, retinal detachment, need for glasses, corneal edema. The patient voiced understanding and wishes to proceed. All lens options were discussed with the patient including monofocal lenses, multifocal lenses, and toric lenses. The risks and benefits of each were discussed, including halos, glare, and possible need for glasses. No guarantees about vision after surgery were given. The patient voiced understanding and wishes to proceed    Proceed with CE/IOL right eye followed by left eye .      (H40.003) Glaucoma suspect of both eyes  Based on cup/disc ratio  OCT nerve fiber layer 11/04/24:   Right eye - G(g) 90 NI (g) 110 TI (g) 151 NS (g) 95 TS (g) 123      Left eye - G(g) 100 NI (g) 118 TI (g) 156 NS (g) 120 TS (g) 132  OCT thinner both eyes but still green and within normal limits with normal intraocular pressure  Will continue to monitor  Recommend annual oct rnfl    (E11.36) Type 2 diabetes mellitus with diabetic cataract, unspecified whether long term insulin use (H) - Both Eyes  (E11.9) Type 2 diabetes mellitus without retinopathy (H)  Most recent HgBA1c 7.9 on 9/17/24  No background diabetic retinopathy  or neovascularization noted on today's exam.  Discussed ocular and systemic benefits of blood pressure and blood sugar control.  Return in 1 year for full exam with dilation or sooner if changes to vision.        (H52.03,  H52.203,  H52.4) Hyperopia of both eyes with astigmatism and presbyopia  Doing well in current Mrx      Return for POD0.        Doug Chan MD     Attending Physician Attestation:  Complete documentation of historical and exam elements from today's encounter can be found in the full encounter summary report (not reduplicated in this progress note).  I personally obtained the chief complaint(s) and history of present illness.  I confirmed and edited as necessary the review of systems, past medical/surgical history, family history, social history, and examination findings as documented by others; and I examined the patient myself.  I personally reviewed the relevant tests, images, and reports as documented above.  I formulated and edited as necessary the assessment and plan and discussed the findings and management plan with the patient and family. - Doug Chan MD

## 2024-11-13 ENCOUNTER — TELEPHONE (OUTPATIENT)
Dept: OPHTHALMOLOGY | Facility: CLINIC | Age: 69
End: 2024-11-13
Payer: COMMERCIAL

## 2024-11-13 NOTE — TELEPHONE ENCOUNTER
Phone call to patient to schedule surgery writer did not get an answer but did leave voicemail for patient to call back .      [phone]      Camille Jorgensen on 11/13/2024 at 11:24 AM